# Patient Record
Sex: FEMALE | Race: WHITE
[De-identification: names, ages, dates, MRNs, and addresses within clinical notes are randomized per-mention and may not be internally consistent; named-entity substitution may affect disease eponyms.]

---

## 2018-07-09 ENCOUNTER — HOSPITAL ENCOUNTER (EMERGENCY)
Dept: HOSPITAL 89 - ER | Age: 23
Discharge: HOME | End: 2018-07-09
Payer: SELF-PAY

## 2018-07-09 VITALS — SYSTOLIC BLOOD PRESSURE: 108 MMHG | DIASTOLIC BLOOD PRESSURE: 68 MMHG

## 2018-07-09 DIAGNOSIS — F41.9: Primary | ICD-10-CM

## 2018-07-09 LAB — PLATELET COUNT, AUTOMATED: 222 K/UL (ref 150–450)

## 2018-07-09 PROCEDURE — 71046 X-RAY EXAM CHEST 2 VIEWS: CPT

## 2018-07-09 PROCEDURE — 82565 ASSAY OF CREATININE: CPT

## 2018-07-09 PROCEDURE — 82040 ASSAY OF SERUM ALBUMIN: CPT

## 2018-07-09 PROCEDURE — 84703 CHORIONIC GONADOTROPIN ASSAY: CPT

## 2018-07-09 PROCEDURE — 84450 TRANSFERASE (AST) (SGOT): CPT

## 2018-07-09 PROCEDURE — 82374 ASSAY BLOOD CARBON DIOXIDE: CPT

## 2018-07-09 PROCEDURE — 82247 BILIRUBIN TOTAL: CPT

## 2018-07-09 PROCEDURE — 84132 ASSAY OF SERUM POTASSIUM: CPT

## 2018-07-09 PROCEDURE — 84484 ASSAY OF TROPONIN QUANT: CPT

## 2018-07-09 PROCEDURE — 82435 ASSAY OF BLOOD CHLORIDE: CPT

## 2018-07-09 PROCEDURE — 84295 ASSAY OF SERUM SODIUM: CPT

## 2018-07-09 PROCEDURE — 85025 COMPLETE CBC W/AUTO DIFF WBC: CPT

## 2018-07-09 PROCEDURE — 84520 ASSAY OF UREA NITROGEN: CPT

## 2018-07-09 PROCEDURE — 82947 ASSAY GLUCOSE BLOOD QUANT: CPT

## 2018-07-09 PROCEDURE — 84460 ALANINE AMINO (ALT) (SGPT): CPT

## 2018-07-09 PROCEDURE — 82310 ASSAY OF CALCIUM: CPT

## 2018-07-09 PROCEDURE — 93005 ELECTROCARDIOGRAM TRACING: CPT

## 2018-07-09 PROCEDURE — 85379 FIBRIN DEGRADATION QUANT: CPT

## 2018-07-09 PROCEDURE — 99284 EMERGENCY DEPT VISIT MOD MDM: CPT

## 2018-07-09 PROCEDURE — 84075 ASSAY ALKALINE PHOSPHATASE: CPT

## 2018-07-09 PROCEDURE — 84155 ASSAY OF PROTEIN SERUM: CPT

## 2018-07-09 NOTE — EKG
FACILITY: [facility] - [place] 

 

PATIENT NAME: EDER LEONARDO

: [id number]

MR: [id number]

V: [id number]

EXAM DATE: 

ORDERING PHYSICIAN: CARLOS MAN

TECHNOLOGIST: NIECY

 

Test Reason : SOB

Blood Pressure : ***/*** mmHG

Vent. Rate : 085 BPM     Atrial Rate : 085 BPM

   P-R Int : 140 ms          QRS Dur : 086 ms

    QT Int : 374 ms       P-R-T Axes : 043 073 030 degrees

   QTc Int : 445 ms

 

Normal sinus rhythm

Normal ECG

No previous ECGs available

Confirmed by FRANCISCO ERVIN ([id number]) on 7/10/2018 6:25:35 AM

 

Referred By:  KAI           Confirmed By:FRANCISCO ERVIN

## 2018-07-09 NOTE — RADIOLOGY IMAGING REPORT
FACILITY: [facility] - [place] 

 

PATIENT NAME: Aravind Maria

: 1995

MR: [id number]

V: [id number]

EXAM DATE: 

ORDERING PHYSICIAN: CARLOS MAN

TECHNOLOGIST: 

 

[place] [facility] [place]

Patient: Aravind Maria

: 1995

MRN: [id number]

Visit/Account:[id number]

Date of Sevice:  2018

 

ACCESSION #: [id number]

 

CHEST PA AND LAT

 

Additional pertinent History:   Respiratory distress

 

COMPARISON STUDIES:  None

 

FINDINGS:

Support lines and catheters: None

 

Lungs and Pleura:  Lung fields well expanded with no infiltrates or consolidations. No parenchymal ma
ss lesions are seen. There are no effusions

 

Heart and vasculature:  Negative.

 

Annie and Mediastinum:  Negative.

 

Bones and Chest wall:  Negative.

 

Upper Abdomen:  Negative.

 

IMPRESSION:

1. Normal chest

 

Report Dictated By: Ishan Porras MD at 2018 12:12 PM

 

Report E-Signed By: Ishan Porras MD  at 2018 12:13 PM

 

WSN:[id number]

## 2018-07-09 NOTE — ER REPORT
History and Physical


Time Seen By MD:  11:24


Hx. of Stated Complaint:  


PATIENT REPORTS THAT SHE WAS DRIVING HOME FROM THE GROCERY STORE AND STARTED 


HAVING SHORTNESS OF BREATH.


HPI/ROS


CHIEF COMPLAINT: Shortness of breath





HISTORY OF PRESENT ILLNESS: 23-year-old female patient presents to emergency 

room with complaint of shortness of breath. Patient states that she was driving 

home from the grocery store today when she developed shortness of breath. She 

states that she became so short of breath that she had to pull over to catch 

her breath. She states that she is not had any fevers, chills, nausea, vomiting 

or diarrhea. Patient states that she did have some chest pain which is resolved 

this point in time. She states that when she returned home that the shortness 

of breath seemed to worsen. Patient states that she does smoke a pack a week, 

as well as is not currently taking any birth control. She denies being sexually 

active at this time. Patient states that she is feeling "out of it" at this 

time.





REVIEW OF SYSTEMS:


Respiratory: As noted above


Cardiovascular: As noted above


Gastrointestinal: No vomiting, no abdominal pain.


Musculoskeletal: No back pain.


Allergies:  


Coded Allergies:  


     No Known Drug Allergies (Unverified , 8/2/13)


Home Meds


Active Scripts


Hydroxyzine Hcl (HYDROXYZINE HCL) 25 Mg Tablet, 25 MG PO Q6H Y for ANXIETY, #20 

TAB


   Prov:CARLOS MAN         7/9/18


Discontinued Reported Medications


Polyethylene Glycol 3350 (MIRALAX) 17 Gm Powd.pack, 17 GM PO BID


   8/2/13


Desogestrel-Ethinyl Estradiol (DESOGEN) 1 Each Tablet, 1 EACH PO DAILY


   8/2/13


Azithromycin (ZITHROMAX) 1 Gm Packet, 1 TAB PO DAILY, #5


   8/2/13


Discontinued Scripts


Hydrocodone Bit/Acetaminophen (HYDROCODON-ACETAMINOPHEN 5-325) 1 Each Tablet, 1 

EACH PO Q4-6H, #14


   TAKE ONE TABLET BY MOUTH


   EVERY 4-6 HOURS AS NEEDED


   FOR PAIN


   Prov:JAIME HSIEH NP         4/11/14


Amoxicillin/Pot Clav 875-125 Mg Tab (AUGMENTIN 875-125 TABLET) 1 Each Tablet, 1 

TAB PO Q12H, #20 TAB


   TAKE ONE TABLET BY MOUTH EVERY 12 HOURS


   Prov:JAIME HSIEH NP         4/11/14


Past Medical/Surgical History


Patient denies any pertinent medical or surgical history.


Patient has a family medical history of cancer, CAD, stroke, diabetes.


Reviewed Nurses Notes:  Yes


Hx Smoking:  Yes


Smoking Status:  Heavy Tobacco Smoker


Exposure to Second Hand Smoke?:  Yes


Hx Substance Use Disorder:  No


Hx Alcohol Use:  No


Constitutional





Vital Sign - Last 24 Hours








 7/9/18 7/9/18 7/9/18 7/9/18





 11:17 11:19 11:30 11:45


 


Temp  98.1  


 


Pulse  104  89


 


Resp  24  30


 


B/P (MAP) 142/87 (105) 142/87 117/86 (96) 


 


Pulse Ox  96  95


 


O2 Delivery  Room Air  


 


    





 7/9/18 7/9/18 7/9/18 7/9/18





 12:15 12:20 12:25 12:30


 


Pulse 76 75 76 73


 


Resp 16 20 20 13


 


Pulse Ox 97 95 96 92





 7/9/18   





 12:31   


 


B/P (MAP) 108/68 (81)   








Physical Exam


  General Appearance: The patient is alert, has no immediate need for airway 

protection and no current signs of toxicity.


Respiratory: Chest is non tender, lungs are clear to auscultation.


Cardiac: regular rate and rhythm


Gastrointestinal: Abdomen is soft and non tender, no masses, bowel sounds 

normal.


Musculoskeletal:  Neck: Neck is supple and non tender.


   Extremities have full range of motion and are non tender.


Skin: No rashes or lesions.





DIFFERENTIAL DIAGNOSIS: After history and physical exam differential diagnosis 

was considered for shortness of breath including but not limited to pulmonary 

infectious process, COPD, asthma, pulmonary embolus and congestive heart 

failure.





Medical Decision Making


Data Points


Result Diagram:  


7/9/18 1140                                                                    

            7/9/18 1140





Laboratory





Hematology








Test


  7/9/18


11:40


 


Red Blood Count


  4.98 M/uL


(4.17-5.56)


 


Mean Corpuscular Volume


  83.0 fL


(80.0-96.0)


 


Mean Corpuscular Hemoglobin


  28.6 pg


(26.0-33.0)


 


Mean Corpuscular Hemoglobin


Concent 34.4 g/dL


(32.0-36.0)


 


Red Cell Distribution Width


  13.4 %


(11.5-14.5)


 


Mean Platelet Volume


  9.9 fL


(7.2-11.1)


 


Neutrophils (%) (Auto)


  57.0 %


(39.4-72.5)


 


Lymphocytes (%) (Auto)


  31.7 %


(17.6-49.6)


 


Monocytes (%) (Auto)


  9.8 %


(4.1-12.4)


 


Eosinophils (%) (Auto)


  0.8 %


(0.4-6.7)


 


Basophils (%) (Auto)


  0.7 %


(0.3-1.4)


 


Nucleated RBC Relative Count


(auto) 0.0 /100WBC 


 


 


Neutrophils # (Auto)


  4.4 K/uL


(2.0-7.4)


 


Lymphocytes # (Auto)


  2.4 K/uL


(1.3-3.6)


 


Monocytes # (Auto)


  0.8 K/uL


(0.3-1.0)


 


Eosinophils # (Auto)


  0.1 K/uL


(0.0-0.5)


 


Basophils # (Auto)


  0.1 K/uL


(0.0-0.1)


 


Nucleated RBC Absolute Count


(auto) 0.00 K/uL 


 


 


D-Dimer Quantitative (PE/DVT)


  < 0.27 ug/ml


(0-0.50)


 


Sodium Level


  142 mmol/L


(137-145)


 


Potassium Level


  3.1 mmol/L


(3.5-5.0)


 


Chloride Level


  106 mmol/L


()


 


Carbon Dioxide Level


  24 mmol/L


(22-31)


 


Blood Urea Nitrogen 5 mg/dl (7-18) 


 


Creatinine


  0.80 mg/dl


(0.52-1.04)


 


Glomerular Filtration Rate


Calc > 60.0 


 


 


Random Glucose


  108 mg/dl


()


 


Calcium Level


  9.1 mg/dl


(8.4-10.2)


 


Total Bilirubin


  0.5 mg/dl


(0.2-1.3)


 


Aspartate Amino Transf


(AST/SGOT) 19 U/L (0-35) 


 


 


Alanine Aminotransferase


(ALT/SGPT) 17 U/L (0-56) 


 


 


Alkaline Phosphatase 77 U/L (0-126) 


 


Troponin I < 0.012 ng/ml 


 


Total Protein


  7.7 g/dl


(6.3-8.2)


 


Albumin


  4.6 g/dl


(3.5-5.0)


 


Human Chorionic Gonadotropin,


Qual Negative


(NEGATIVE)








Chemistry








Test


  7/9/18


11:40


 


White Blood Count


  7.7 k/uL


(4.5-11.0)


 


Red Blood Count


  4.98 M/uL


(4.17-5.56)


 


Hemoglobin


  14.2 g/dL


(12.0-16.0)


 


Hematocrit


  41.3 %


(34.0-47.0)


 


Mean Corpuscular Volume


  83.0 fL


(80.0-96.0)


 


Mean Corpuscular Hemoglobin


  28.6 pg


(26.0-33.0)


 


Mean Corpuscular Hemoglobin


Concent 34.4 g/dL


(32.0-36.0)


 


Red Cell Distribution Width


  13.4 %


(11.5-14.5)


 


Platelet Count


  222 K/uL


(150-450)


 


Mean Platelet Volume


  9.9 fL


(7.2-11.1)


 


Neutrophils (%) (Auto)


  57.0 %


(39.4-72.5)


 


Lymphocytes (%) (Auto)


  31.7 %


(17.6-49.6)


 


Monocytes (%) (Auto)


  9.8 %


(4.1-12.4)


 


Eosinophils (%) (Auto)


  0.8 %


(0.4-6.7)


 


Basophils (%) (Auto)


  0.7 %


(0.3-1.4)


 


Nucleated RBC Relative Count


(auto) 0.0 /100WBC 


 


 


Neutrophils # (Auto)


  4.4 K/uL


(2.0-7.4)


 


Lymphocytes # (Auto)


  2.4 K/uL


(1.3-3.6)


 


Monocytes # (Auto)


  0.8 K/uL


(0.3-1.0)


 


Eosinophils # (Auto)


  0.1 K/uL


(0.0-0.5)


 


Basophils # (Auto)


  0.1 K/uL


(0.0-0.1)


 


Nucleated RBC Absolute Count


(auto) 0.00 K/uL 


 


 


D-Dimer Quantitative (PE/DVT)


  < 0.27 ug/ml


(0-0.50)


 


Glomerular Filtration Rate


Calc > 60.0 


 


 


Calcium Level


  9.1 mg/dl


(8.4-10.2)


 


Total Bilirubin


  0.5 mg/dl


(0.2-1.3)


 


Aspartate Amino Transf


(AST/SGOT) 19 U/L (0-35) 


 


 


Alanine Aminotransferase


(ALT/SGPT) 17 U/L (0-56) 


 


 


Alkaline Phosphatase 77 U/L (0-126) 


 


Troponin I < 0.012 ng/ml 


 


Total Protein


  7.7 g/dl


(6.3-8.2)


 


Albumin


  4.6 g/dl


(3.5-5.0)


 


Human Chorionic Gonadotropin,


Qual Negative


(NEGATIVE)








Coagulation








Test


  7/9/18


11:40


 


D-Dimer Quantitative (PE/DVT) < 0.27 ug/ml 











EKG/Imaging


EKG Interpretation


12 lead EKG:


      Rhythm: normal sinus rhythm with a ventricular rate of 85 bpm


      Axis: normal 


      QRS: normal


      ST segments: normal


Imaging


CHEST PA AND LAT


 


Additional pertinent History:   Respiratory distress


 


COMPARISON STUDIES:  None


 


FINDINGS:


Support lines and catheters: None


 


Lungs and Pleura:  Lung fields well expanded with no infiltrates or 

consolidations. No parenchymal mass lesions are seen. There are no effusions


 


Heart and vasculature:  Negative.


 


Annie and Mediastinum:  Negative.


 


Bones and Chest wall:  Negative.


 


Upper Abdomen:  Negative.


 


IMPRESSION:


1. Normal chest


 


Report Dictated By: Ishan Porras MD at 7/9/2018 12:12 PM


 


Report E-Signed By: Ishan Porras MD  at 7/9/2018 12:13 PM





ED Course/Re-evaluation


ED Course


Patient was admitted to exam room, history and physical were obtained. 

Differential diagnoses were considered. On examination lungs are clear, heart 

is regular. A CBC, CMP, chest x-ray, troponin, EKG, d-dimer were done. The lab 

results were unremarkable, she did have a slightly low potassium of 3.1. EKG 

showed a normal sinus rhythm, d-dimer was negative. Chest x-ray showed no acute 

cardiopulmonary processes. I discussed the findings with the patient. I believe 

that she likely had some anxiety. I believe there is a component of depression 

with this. We will go ahead and give her hydroxyzine machine take as needed for 

her anxiety. She is to get plenty of rest, increase her fluid intake. I would 

like her to follow-up with [facility]. She is return to emergency room if 

condition worsens. Patient verbalized understanding and agreement with plan.


Decision to Disposition Date:  Jul 9, 2018


Decision to Disposition Time:  12:26





Depart


Departure


Latest Vital Signs





Vital Signs








  Date Time  Temp Pulse Resp B/P (MAP) Pulse Ox O2 Delivery O2 Flow Rate FiO2


 


7/9/18 12:31    108/68 (81)    


 


7/9/18 12:30  73 13  92   


 


7/9/18 11:19 98.1     Room Air  








Impression:  


 Primary Impression:  


 Anxiety


Condition:  Improved


Disposition:  HOME OR SELF-CARE


New Scripts


Hydroxyzine Hcl (HYDROXYZINE HCL) 25 Mg Tablet


25 MG PO Q6H Y for ANXIETY, #20 TAB


   Prov: CARLOS MAN         7/9/18


Patient Instructions:  Anxiety (ED)





Additional Instructions:  


Increase fluid intake.


Get plenty of rest.


Increase potassium in your diet, with bananas.


Follow up with [facility] for your anxiety in the next week.


Return to the ER if condition worsens.











CARLOS MAN Jul 9, 2018 11:24

## 2019-02-25 ENCOUNTER — HOSPITAL ENCOUNTER (EMERGENCY)
Dept: HOSPITAL 89 - ER | Age: 24
Discharge: HOME | End: 2019-02-25
Payer: SELF-PAY

## 2019-02-25 VITALS — SYSTOLIC BLOOD PRESSURE: 99 MMHG | DIASTOLIC BLOOD PRESSURE: 56 MMHG

## 2019-02-25 DIAGNOSIS — K52.9: Primary | ICD-10-CM

## 2019-02-25 LAB — PLATELET COUNT, AUTOMATED: 200 K/UL (ref 150–450)

## 2019-02-25 PROCEDURE — 96374 THER/PROPH/DIAG INJ IV PUSH: CPT

## 2019-02-25 PROCEDURE — 82947 ASSAY GLUCOSE BLOOD QUANT: CPT

## 2019-02-25 PROCEDURE — 82435 ASSAY OF BLOOD CHLORIDE: CPT

## 2019-02-25 PROCEDURE — 86140 C-REACTIVE PROTEIN: CPT

## 2019-02-25 PROCEDURE — 87502 INFLUENZA DNA AMP PROBE: CPT

## 2019-02-25 PROCEDURE — 86677 HELICOBACTER PYLORI ANTIBODY: CPT

## 2019-02-25 PROCEDURE — 74177 CT ABD & PELVIS W/CONTRAST: CPT

## 2019-02-25 PROCEDURE — 82310 ASSAY OF CALCIUM: CPT

## 2019-02-25 PROCEDURE — 84155 ASSAY OF PROTEIN SERUM: CPT

## 2019-02-25 PROCEDURE — 84075 ASSAY ALKALINE PHOSPHATASE: CPT

## 2019-02-25 PROCEDURE — 84460 ALANINE AMINO (ALT) (SGPT): CPT

## 2019-02-25 PROCEDURE — 83690 ASSAY OF LIPASE: CPT

## 2019-02-25 PROCEDURE — 96361 HYDRATE IV INFUSION ADD-ON: CPT

## 2019-02-25 PROCEDURE — 82565 ASSAY OF CREATININE: CPT

## 2019-02-25 PROCEDURE — 81025 URINE PREGNANCY TEST: CPT

## 2019-02-25 PROCEDURE — 99284 EMERGENCY DEPT VISIT MOD MDM: CPT

## 2019-02-25 PROCEDURE — 84450 TRANSFERASE (AST) (SGOT): CPT

## 2019-02-25 PROCEDURE — 84132 ASSAY OF SERUM POTASSIUM: CPT

## 2019-02-25 PROCEDURE — 84520 ASSAY OF UREA NITROGEN: CPT

## 2019-02-25 PROCEDURE — 82374 ASSAY BLOOD CARBON DIOXIDE: CPT

## 2019-02-25 PROCEDURE — 85025 COMPLETE CBC W/AUTO DIFF WBC: CPT

## 2019-02-25 PROCEDURE — 96375 TX/PRO/DX INJ NEW DRUG ADDON: CPT

## 2019-02-25 PROCEDURE — 81001 URINALYSIS AUTO W/SCOPE: CPT

## 2019-02-25 PROCEDURE — 82247 BILIRUBIN TOTAL: CPT

## 2019-02-25 PROCEDURE — 82040 ASSAY OF SERUM ALBUMIN: CPT

## 2019-02-25 PROCEDURE — 84295 ASSAY OF SERUM SODIUM: CPT

## 2019-02-25 NOTE — ER REPORT
History and Physical


Time Seen By MD:  10:32


HPI/ROS


CHIEF COMPLAINT: Abdominal pain, diarrhea





HISTORY OF PRESENT ILLNESS: Patient is a 23-year-old female with no prior 


medical problems here with complaints of abdominal pain, diarrhea for the past 


several days. Patient reports that she has decreased appetite, nausea, diffuse 


pain. Denies prior surgeries to the abdomen, medical problems or medications. 


Patient is afebrile at time of evaluation, nontoxic-appearing





REVIEW OF SYSTEMS:


Constitutional: No fever, no chills.


Eyes: No discharge.


ENT: No sore throat. 


Cardiovascular: No chest pain, no palpitations.


Respiratory: No cough, no shortness of breath.


Gastrointestinal: + diffuse abdominal pain, no vomiting.


Genitourinary: No hematuria.


Musculoskeletal: No back pain.


Skin: No rashes.


Neurological: No headache.


Allergies:  


Coded Allergies:  


     amoxicillin (Verified  Allergy, Unknown, 19)


Home Meds


Active Scripts


Ondansetron 4 Mg Odt (ONDANSETRON 4 MG ODT) 4 Mg Tab.rapdis, 4 MG PO ONCE, #30 


TAB


   Prov:ANJALI GUERRERO DO         19


Ciprofloxacin Hcl 500 Mg Tab (CIPRO 500 MG TAB) 500 Mg Tablet, 500 MG PO BID for


7 Days, #14 TAB


   Prov:ANJALI GUERRERO DO         19


Metronidazole (METRONIDAZOLE) 500 Mg Tablet, 500 MG PO TID for 7 Days, #21 TAB


   Prov:ANJALI GUERRERO DO         19


Hydroxyzine Hcl (HYDROXYZINE HCL) 25 Mg Tablet, 25 MG PO Q6H PRN for ANXIETY, 


#20 TAB


   Prov:CARLOS MAN FNP         18


Hx Smoking:  Yes


Smoking Status:  Heavy Tobacco Smoker


Exposure to Second Hand Smoke?:  Yes


Hx Substance Use Disorder:  No


Hx Alcohol Use:  No


Constitutional





Vital Sign - Last 24 Hours








 19





 10:30 10:37 10:37 10:45


 


Temp   97.6 


 


Pulse ???  101 88


 


Resp   16 


 


B/P (MAP)  126/85 (99) 126/85 


 


Pulse Ox   94 95


 


O2 Delivery   Room Air 


 


    





 19





 11:00 11:13 11:15 11:30


 


Pulse 85  98 90


 


B/P (MAP)  116/63 (80)  116/61 (79)


 


Pulse Ox 97  91 88





 19





 11:45 12:00 12:15 12:20


 


Pulse 75 74 74 75


 


B/P (MAP)  101/59 (73)  


 


Pulse Ox 94 94 93 93





 19





 12:30 12:35 12:50 13:00


 


Pulse  76 83 


 


B/P (MAP) 101/61 (74)   89/45 (60)


 


Pulse Ox  93 93 





 19





 13:05 13:20 13:30 13:35


 


Pulse 87 78  81


 


B/P (MAP)   99/56 (70) 


 


Pulse Ox 93 94  93








Physical Exam


   General Appearance: The patient is alert, has no immediate need for airway 


protection and no signs of toxicity. Moderate discomfort due to pain


Eyes: Pupils equal and round no pallor or injection.


ENT, Mouth: Mucous membranes are moist.


Respiratory: There are no retractions, lungs are clear to auscultation.


Cardiovascular: Regular rate and rhythm. 


Gastrointestinal:  Abdomen is diffusely tender with no guarding or rebound.


Neurological: No focal neurological findings


Skin: Warm and dry, no rashes.


Musculoskeletal:  Neck is supple non tender.


      Extremities are nontender, nonswollen and have full range of motion.





DIFFERENTIAL DIAGNOSIS: After history and physical exam differential diagnosis 


was considered for abdominal pain including but not limited to appendicitis, 


cholecystitis, gastritis and urinary tract infection.





Medical Decision Making


Data Points


Result Diagram:  


19 1109                                                                    


           19 1109





Laboratory





Hematology








Test


 19


00:00 19


10:35 19


10:51 19


11:09


 


Urine HCG, Qualitative


 Negative


(NEGATIVE) 


 


 





 


Urine Color  Straw   


 


Urine Clarity


 


 Slightly-cloudy


 


 





 


Urine pH


 


 5.0 pH


(4.8-9.5) 


 





 


Urine Specific Gravity  1.004   


 


Urine Protein


 


 Negative mg/dL


(NEGATIVE) 


 





 


Urine Glucose (UA)


 


 Negative mg/dL


(NEGATIVE) 


 





 


Urine Ketones


 


 Negative mg/dL


(NEGATIVE) 


 





 


Urine Blood


 


 Negative


(NEGATIVE) 


 





 


Urine Nitrite


 


 Negative


(NEGATIVE) 


 





 


Urine Bilirubin


 


 Negative


(NEGATIVE) 


 





 


Urine Urobilinogen


 


 Negative mg/dL


(0.2-1.9) 


 





 


Urine Leukocyte Esterase


 


 Small


(NEGATIVE) 


 





 


Urine RBC


 


 None /HPF


(0-2/HPF) 


 





 


Urine WBC


 


 1 /HPF


(0-5/HPF) 


 





 


Urine Squamous Epithelial


Cells 


 Many /LPF


(</=FEW) 


 





 


Urine Amorphous Crystals  Few /HPF   


 


Urine Bacteria


 


 Negative /HPF


(NONE-FEW) 


 





 


Urine Mucus


 


 Few /HPF


(NONE-FEW) 


 





 


Influenza Virus Type A (PCR)


 


 


 Negative


(NEGATIVE) 





 


Influenza Virus Type B (PCR)


 


 


 Negative


(NEGATIVE) 





 


Red Blood Count


 


 


 


 4.71 M/uL


(4.17-5.56)


 


Mean Corpuscular Volume


 


 


 


 84.4 fL


(80.0-96.0)


 


Mean Corpuscular Hemoglobin


 


 


 


 28.2 pg


(26.0-33.0)


 


Mean Corpuscular Hemoglobin


Concent 


 


 


 33.4 g/dL


(32.0-36.0)


 


Red Cell Distribution Width


 


 


 


 13.3 %


(11.5-14.5)


 


Mean Platelet Volume


 


 


 


 9.1 fL


(7.2-11.1)


 


Neutrophils (%) (Auto)


 


 


 


 58.5 %


(39.4-72.5)


 


Lymphocytes (%) (Auto)


 


 


 


 31.8 %


(17.6-49.6)


 


Monocytes (%) (Auto)


 


 


 


 8.0 %


(4.1-12.4)


 


Eosinophils (%) (Auto)


 


 


 


 1.3 %


(0.4-6.7)


 


Basophils (%) (Auto)


 


 


 


 0.4 %


(0.3-1.4)


 


Nucleated RBC Relative Count


(auto) 


 


 


 0.0 /100WBC 





 


Neutrophils # (Auto)


 


 


 


 3.8 K/uL


(2.0-7.4)


 


Lymphocytes # (Auto)


 


 


 


 2.1 K/uL


(1.3-3.6)


 


Monocytes # (Auto)


 


 


 


 0.5 K/uL


(0.3-1.0)


 


Eosinophils # (Auto)


 


 


 


 0.1 K/uL


(0.0-0.5)


 


Basophils # (Auto)


 


 


 


 0.0 K/uL


(0.0-0.1)


 


Nucleated RBC Absolute Count


(auto) 


 


 


 0.00 K/uL 





 


Sodium Level


 


 


 


 139 mmol/L


(137-145)


 


Potassium Level


 


 


 


 4.1 mmol/L


(3.5-5.0)


 


Chloride Level


 


 


 


 107 mmol/L


()


 


Carbon Dioxide Level


 


 


 


 23 mmol/L


(22-31)


 


Blood Urea Nitrogen    8 mg/dl (7-18) 


 


Creatinine


 


 


 


 0.60 mg/dl


(0.52-1.04)


 


Glomerular Filtration Rate


Calc 


 


 


 > 60.0 





 


Random Glucose


 


 


 


 87 mg/dl


()


 


Calcium Level


 


 


 


 8.8 mg/dl


(8.4-10.2)


 


Total Bilirubin


 


 


 


 0.5 mg/dl


(0.2-1.3)


 


Aspartate Amino Transf


(AST/SGOT) 


 


 


 21 U/L (0-35) 





 


Alanine Aminotransferase


(ALT/SGPT) 


 


 


 24 U/L (0-56) 





 


Alkaline Phosphatase    68 U/L (0-126) 


 


C-Reactive Protein


 


 


 


 0.7 mg/dl


(<1.0)


 


Total Protein


 


 


 


 7.4 g/dl


(6.3-8.2)


 


Albumin


 


 


 


 4.3 g/dl


(3.5-5.0)


 


Lipase


 


 


 


 57 U/L


()


 


Helicobacter pylori IgG


Antibody 


 


 


 Negative


(NEGATIVE)








Chemistry








Test


 19


00:00 19


10:35 19


10:51 19


11:09


 


Urine HCG, Qualitative


 Negative


(NEGATIVE) 


 


 





 


Urine Color  Straw   


 


Urine Clarity


 


 Slightly-cloudy


 


 





 


Urine pH


 


 5.0 pH


(4.8-9.5) 


 





 


Urine Specific Gravity  1.004   


 


Urine Protein


 


 Negative mg/dL


(NEGATIVE) 


 





 


Urine Glucose (UA)


 


 Negative mg/dL


(NEGATIVE) 


 





 


Urine Ketones


 


 Negative mg/dL


(NEGATIVE) 


 





 


Urine Blood


 


 Negative


(NEGATIVE) 


 





 


Urine Nitrite


 


 Negative


(NEGATIVE) 


 





 


Urine Bilirubin


 


 Negative


(NEGATIVE) 


 





 


Urine Urobilinogen


 


 Negative mg/dL


(0.2-1.9) 


 





 


Urine Leukocyte Esterase


 


 Small


(NEGATIVE) 


 





 


Urine RBC


 


 None /HPF


(0-2/HPF) 


 





 


Urine WBC


 


 1 /HPF


(0-5/HPF) 


 





 


Urine Squamous Epithelial


Cells 


 Many /LPF


(</=FEW) 


 





 


Urine Amorphous Crystals  Few /HPF   


 


Urine Bacteria


 


 Negative /HPF


(NONE-FEW) 


 





 


Urine Mucus


 


 Few /HPF


(NONE-FEW) 


 





 


Influenza Virus Type A (PCR)


 


 


 Negative


(NEGATIVE) 





 


Influenza Virus Type B (PCR)


 


 


 Negative


(NEGATIVE) 





 


White Blood Count


 


 


 


 6.5 k/uL


(4.5-11.0)


 


Red Blood Count


 


 


 


 4.71 M/uL


(4.17-5.56)


 


Hemoglobin


 


 


 


 13.3 g/dL


(12.0-16.0)


 


Hematocrit


 


 


 


 39.8 %


(34.0-47.0)


 


Mean Corpuscular Volume


 


 


 


 84.4 fL


(80.0-96.0)


 


Mean Corpuscular Hemoglobin


 


 


 


 28.2 pg


(26.0-33.0)


 


Mean Corpuscular Hemoglobin


Concent 


 


 


 33.4 g/dL


(32.0-36.0)


 


Red Cell Distribution Width


 


 


 


 13.3 %


(11.5-14.5)


 


Platelet Count


 


 


 


 200 K/uL


(150-450)


 


Mean Platelet Volume


 


 


 


 9.1 fL


(7.2-11.1)


 


Neutrophils (%) (Auto)


 


 


 


 58.5 %


(39.4-72.5)


 


Lymphocytes (%) (Auto)


 


 


 


 31.8 %


(17.6-49.6)


 


Monocytes (%) (Auto)


 


 


 


 8.0 %


(4.1-12.4)


 


Eosinophils (%) (Auto)


 


 


 


 1.3 %


(0.4-6.7)


 


Basophils (%) (Auto)


 


 


 


 0.4 %


(0.3-1.4)


 


Nucleated RBC Relative Count


(auto) 


 


 


 0.0 /100WBC 





 


Neutrophils # (Auto)


 


 


 


 3.8 K/uL


(2.0-7.4)


 


Lymphocytes # (Auto)


 


 


 


 2.1 K/uL


(1.3-3.6)


 


Monocytes # (Auto)


 


 


 


 0.5 K/uL


(0.3-1.0)


 


Eosinophils # (Auto)


 


 


 


 0.1 K/uL


(0.0-0.5)


 


Basophils # (Auto)


 


 


 


 0.0 K/uL


(0.0-0.1)


 


Nucleated RBC Absolute Count


(auto) 


 


 


 0.00 K/uL 





 


Glomerular Filtration Rate


Calc 


 


 


 > 60.0 





 


Calcium Level


 


 


 


 8.8 mg/dl


(8.4-10.2)


 


Total Bilirubin


 


 


 


 0.5 mg/dl


(0.2-1.3)


 


Aspartate Amino Transf


(AST/SGOT) 


 


 


 21 U/L (0-35) 





 


Alanine Aminotransferase


(ALT/SGPT) 


 


 


 24 U/L (0-56) 





 


Alkaline Phosphatase    68 U/L (0-126) 


 


C-Reactive Protein


 


 


 


 0.7 mg/dl


(<1.0)


 


Total Protein


 


 


 


 7.4 g/dl


(6.3-8.2)


 


Albumin


 


 


 


 4.3 g/dl


(3.5-5.0)


 


Lipase


 


 


 


 57 U/L


()


 


Helicobacter pylori IgG


Antibody 


 


 


 Negative


(NEGATIVE)








Urinalysis








Test


 19


00:00 19


10:35


 


Urine HCG, Qualitative


 Negative


(NEGATIVE) 





 


Urine Color  Straw 


 


Urine Clarity


 


 Slightly-cloudy





 


Urine pH


 


 5.0 pH


(4.8-9.5)


 


Urine Specific Gravity  1.004 


 


Urine Protein


 


 Negative mg/dL


(NEGATIVE)


 


Urine Glucose (UA)


 


 Negative mg/dL


(NEGATIVE)


 


Urine Ketones


 


 Negative mg/dL


(NEGATIVE)


 


Urine Blood


 


 Negative


(NEGATIVE)


 


Urine Nitrite


 


 Negative


(NEGATIVE)


 


Urine Bilirubin


 


 Negative


(NEGATIVE)


 


Urine Urobilinogen


 


 Negative mg/dL


(0.2-1.9)


 


Urine Leukocyte Esterase


 


 Small


(NEGATIVE)


 


Urine RBC


 


 None /HPF


(0-2/HPF)


 


Urine WBC


 


 1 /HPF


(0-5/HPF)


 


Urine Squamous Epithelial


Cells 


 Many /LPF


(</=FEW)


 


Urine Amorphous Crystals  Few /HPF 


 


Urine Bacteria


 


 Negative /HPF


(NONE-FEW)


 


Urine Mucus


 


 Few /HPF


(NONE-FEW)











EKG/Imaging


Imaging


Location: [facility]


Patient: Aravind Maria


: 1995


MRN: [id number]


Visit/Account:[id number]


Date of Sevice:  2019


 


ACCESSION #: [id number]


 


CT ABDOMEN PELVIS W/ CON


 


HISTORY:  Abdominal pain.


 


TECHNIQUE:  CT abdomen and pelvis with intravenous contrast.


 


One of the following dose optimization techniques was utilized in the 


performance of this exam: Automated exposure control; adjustment of the mA 


and/or kV according to the patient's size; or use of an iterative  reconstructio


n technique.  Specific details can be referenced in the facility's radiology CT 


exam operational policy.


 


CONTRAST:  75 mL Isovue-370.


 


COMPARISON:  None.


 


FINDINGS:


 


Visualized lung bases:  Negative.


 


Hepatobiliary:  Negative.


 


Spleen:  Negative.


 


Adrenals:  Negative.


 


Pancreas:  Negative.


 


Kidneys/:  1.8 cm cyst within the left kidney. Multiple follicles within the 


ovaries. Otherwise negative.


 


GI:  Mild/moderate circumference or wall thickening within the distal colon. No 


evidence for obstruction. Appendix is unremarkable.


 


Vessels/spaces/nodes:  Negative.


 


Bones/soft tissues:  Negative.


 


IMPRESSION:


Mild/moderate circumference or wall thickening within the distal colon which is 


likely related to an infectious/inflammatory colitis.





ED Course/Re-evaluation


ED Course


Patient is a 23-year-old female here with complaints of diffuse abdominal pain 


acute onset for several days and an episode of bloody diarrhea yesterday here 


with complaints of persistent diffuse worsening abdominal pain. Patient denies 


fevers or chills, difficulty breathing, vomiting. Patient denies prior history 


of surgical interventions the abdomen, current medications. Patient is healthy 


at baseline. Patient was identified to have circumferential distal colon Wall t


hickening consistent with colitis on CT imaging of the abdomen and pelvis. Labs 


were unremarkable with no leukocytosis, flu was negative. Patient was given 


scripts for Cipro and Flagyl for treatment. Patient was stable at time of 


discharge and was given return precautions, prescription for Zofran for symptom 


management. Recommend establishing PCP


Decision to Disposition Date:  2019


Decision to Disposition Time:  13:36





Depart


Departure


Latest Vital Signs





Vital Signs








  Date Time  Temp Pulse Resp B/P (MAP) Pulse Ox O2 Delivery O2 Flow Rate FiO2


 


19 13:35  81   93   


 


19 13:30    99/56 (70)    


 


19 10:37 97.6  16   Room Air  








Impression:  


   Primary Impression:  


   Colitis


Condition:  Improved


Disposition:  HOME OR SELF-CARE


New Scripts


Ondansetron 4 Mg Odt (ONDANSETRON 4 MG ODT) 4 Mg Tab.rapdis


4 MG PO ONCE, #30 TAB


   Prov: ANJALI GUERRERO DO         19 


Ciprofloxacin Hcl 500 Mg Tab (CIPRO 500 MG TAB) 500 Mg Tablet


500 MG PO BID for 7 Days, #14 TAB


   Prov: ANJALI GUERRERO DO         19 


Metronidazole (METRONIDAZOLE) 500 Mg Tablet


500 MG PO TID for 7 Days, #21 TAB


   Prov: ANJALI GUERRERO DO         19


Departure Forms:  ER Transition Record, Medications Reconciliation,    Off 


Work/School Form,    School or Work Release?:  Work


   Number of days to be released:  2


Patient Portal Information


Patient Instructions:  Colitis (ED)





Additional Instructions:  


You were diagnosed with a colitis. Please take metronidazole 1 tablet 3 times 


daily for 7 days. Please take Cipro 1 tablet twice daily for 7 days. Please 


follow-up with your primary care doctor in one week. Please return immediately 


if you develop worsening pain, inability to keep down food or fluids, fevers.











ANJALI GUERRERO DO           2019 10:33

## 2019-02-25 NOTE — RADIOLOGY IMAGING REPORT
FACILITY: [facility] [place] 

 

PATIENT NAME: Aravind Maria

: 1995

MR: [id number]

V: [id number]

EXAM DATE: 

ORDERING PHYSICIAN: ANJALI GUERRERO

TECHNOLOGIST: 

 

Location: [facility]

Patient: Aravind Maria

: 1995

MRN: [id number]

Visit/Account:[id number]

Date of Sevice:  2019

 

ACCESSION #: [id number]

 

CT ABDOMEN PELVIS W/ CON

 

HISTORY:  Abdominal pain.

 

TECHNIQUE:  CT abdomen and pelvis with intravenous contrast.

 

One of the following dose optimization techniques was utilized in the performance of this exam: Autom
ated exposure control; adjustment of the mA and/or kV according to the patient's size; or use of an i
terative  reconstruction technique.  Specific details can be referenced in the facility's radiology C
T exam operational policy.

 

CONTRAST:  75 mL Isovue-370.

 

COMPARISON:  None.

 

FINDINGS:

 

Visualized lung bases:  Negative.

 

Hepatobiliary:  Negative.

 

Spleen:  Negative.

 

Adrenals:  Negative.

 

Pancreas:  Negative.

 

Kidneys/:  1.8 cm cyst within the left kidney. Multiple follicles within the ovaries. Otherwise neg
ative.

 

GI:  Mild/moderate circumference or wall thickening within the distal colon. No evidence for obstruct
ion. Appendix is unremarkable.

 

Vessels/spaces/nodes:  Negative.

 

Bones/soft tissues:  Negative.

 

IMPRESSION:

Mild/moderate circumference or wall thickening within the distal colon which is likely related to an 
infectious/inflammatory colitis.

 

Report Dictated By: Andrés Bryson MD at 2019 11:47 AM

 

Report E-Signed By: Andrés Bryson MD  at 2019 11:50 AM

 

WSN:[id number]